# Patient Record
Sex: FEMALE | Race: WHITE | Employment: FULL TIME | URBAN - METROPOLITAN AREA
[De-identification: names, ages, dates, MRNs, and addresses within clinical notes are randomized per-mention and may not be internally consistent; named-entity substitution may affect disease eponyms.]

---

## 2021-04-16 ENCOUNTER — TELEPHONE (OUTPATIENT)
Dept: INTERNAL MEDICINE CLINIC | Age: 61
End: 2021-04-16

## 2021-08-06 ENCOUNTER — OFFICE VISIT (OUTPATIENT)
Dept: INTERNAL MEDICINE CLINIC | Age: 61
End: 2021-08-06
Payer: COMMERCIAL

## 2021-08-06 VITALS
SYSTOLIC BLOOD PRESSURE: 126 MMHG | HEART RATE: 63 BPM | RESPIRATION RATE: 14 BRPM | WEIGHT: 83.4 LBS | HEIGHT: 64 IN | OXYGEN SATURATION: 100 % | TEMPERATURE: 97.6 F | DIASTOLIC BLOOD PRESSURE: 76 MMHG | BODY MASS INDEX: 14.24 KG/M2

## 2021-08-06 DIAGNOSIS — K27.9 PEPTIC ULCER DISEASE: ICD-10-CM

## 2021-08-06 DIAGNOSIS — D22.9 MULTIPLE NEVI: ICD-10-CM

## 2021-08-06 DIAGNOSIS — R63.6 UNDERWEIGHT: ICD-10-CM

## 2021-08-06 DIAGNOSIS — I21.4 NSTEMI (NON-ST ELEVATED MYOCARDIAL INFARCTION) (HCC): ICD-10-CM

## 2021-08-06 DIAGNOSIS — C83.31 DIFFUSE LARGE B-CELL LYMPHOMA OF LYMPH NODES OF NECK (HCC): ICD-10-CM

## 2021-08-06 DIAGNOSIS — Z12.31 ENCOUNTER FOR SCREENING MAMMOGRAM FOR MALIGNANT NEOPLASM OF BREAST: ICD-10-CM

## 2021-08-06 DIAGNOSIS — I25.10 CORONARY ARTERY DISEASE INVOLVING NATIVE CORONARY ARTERY OF NATIVE HEART WITHOUT ANGINA PECTORIS: Primary | ICD-10-CM

## 2021-08-06 DIAGNOSIS — Z00.00 ROUTINE ADULT HEALTH MAINTENANCE: ICD-10-CM

## 2021-08-06 PROBLEM — Z90.5 HISTORY OF NEPHRECTOMY: Status: ACTIVE | Noted: 2021-08-06

## 2021-08-06 PROBLEM — I25.2 HISTORY OF NON-ST ELEVATION MYOCARDIAL INFARCTION (NSTEMI): Status: ACTIVE | Noted: 2021-08-06

## 2021-08-06 PROCEDURE — 99204 OFFICE O/P NEW MOD 45 MIN: CPT | Performed by: INTERNAL MEDICINE

## 2021-08-06 RX ORDER — PANTOPRAZOLE SODIUM 40 MG/1
TABLET, DELAYED RELEASE ORAL
COMMUNITY
Start: 2021-07-21

## 2021-08-06 RX ORDER — RANOLAZINE 1000 MG/1
TABLET, EXTENDED RELEASE ORAL 2 TIMES DAILY
COMMUNITY
Start: 2021-08-02

## 2021-08-06 RX ORDER — LOSARTAN POTASSIUM 50 MG/1
TABLET ORAL DAILY
COMMUNITY

## 2021-08-06 NOTE — ASSESSMENT & PLAN NOTE
Diagnosed with an NSTEMI in 2016. Had a REA to her RCA. Subsequently had an episode of syncope and had an ICD placed. Currently reports noting some shortness of breath with activity over the past 2-3 months. Denies any current chest pain. Advised patient to call her cardiologist at [facility] to move up her appointment from next month given her dyspnea on exertion. Otherwise continue aspirin, Coreg 6.25 twice a day, Plavix 75 daily, losartan 50 daily, ranolazine 1000 twice a day, and atorvastatin 40.   No changes recommended

## 2021-08-06 NOTE — PROGRESS NOTES
Assessment and Plan     1. Coronary artery disease involving native coronary artery of native heart without angina pectoris  Assessment & Plan:  Diagnosed with an NSTEMI in 2016. Had a REA to her RCA. Subsequently had an episode of syncope and had an ICD placed. Currently reports noting some shortness of breath with activity over the past 2-3 months. Denies any current chest pain. Advised patient to call her cardiologist at [facility] to move up her appointment from next month given her dyspnea on exertion. Otherwise continue aspirin, Coreg 6.25 twice a day, Plavix 75 daily, losartan 50 daily, ranolazine 1000 twice a day, and atorvastatin 40. No changes recommended  2. NSTEMI (non-ST elevated myocardial infarction) ([facility] [address]  3. Peptic ulcer disease  Assessment & Plan:  Diagnosed previously. No current issues. Continue pantoprazole 40 mg daily. No changes recommended  4. Underweight  Assessment & Plan:  Lost a significant amount of weight over the past year with her lymphoma diagnosis. She is currently seeing a nutritionist through her oncologist  5. Diffuse large B-cell lymphoma of lymph nodes of neck [facility])  Assessment & Plan:  Diagnosed February 2020 after presenting with neck nodules. Status post chemotherapy. Doing well since then  Continue to follow with Dr. Alek Fishman with oncology  6. Multiple nevi  Assessment & Plan:  Follow-up for skin checks with dermatology  7. Encounter for screening mammogram for malignant neoplasm of breast  -     [facility] 3D KIRAN W MAMMO BI SCREENING INCL CAD; Future  8. Routine adult health maintenance  Assessment & Plan:  Had a colonoscopy done with [facility]. Release form to get those records. Mammogram ordered. Advised to schedule appointment for Pap smear. She is eligible for lung cancer screening but she had a PET scan done last year for her lymphoma diagnosis.   Reevaluate next year       Benefits, risks, possible drug interactions, and side effects of all new medications were reviewed with the patient. Pt verbalized understanding. Return to clinic: 3 months for Pap, weight, shortness of breath, health maintenance    An electronic signature was used to authenticate this note. Robbin Velásquez MD  [facility]  8/6/2021    Future Appointments   Date Time Provider Meghan Garcia   51/2/8557  5:31 PM Ceci Ledesma MD [facility] BS AMB        History of Present Illness   Chief Complaint   Establish care    Arlyn Fischer is a 63 y.o. female         Review of Systems   Constitutional: Negative for chills and fever. HENT: Negative for hearing loss. Eyes: Negative for blurred vision. Respiratory: Positive for shortness of breath. Cardiovascular: Negative for chest pain. Gastrointestinal: Negative for abdominal pain, blood in stool, constipation, diarrhea, melena, nausea and vomiting. Genitourinary: Negative for dysuria and hematuria. Musculoskeletal: Negative for joint pain. Skin: Negative for rash. Neurological: Negative for headaches. Past Medical History   No Known Allergies     Current Outpatient Medications   Medication Sig    losartan (COZAAR) 50 mg tablet daily.  pantoprazole (PROTONIX) 40 mg tablet TAKE 1 TABLET BY MOUTH EVERY MORNING 30 MINUTES BEFORE BREAKFAST    ranolazine ER (RANEXA) 1,000 mg two (2) times a day.  aspirin 81 mg chewable tablet Take 1 Tab by mouth daily.  atorvastatin (LIPITOR) 40 mg tablet Take 1 Tab by mouth nightly.  carvedilol (COREG) 6.25 mg tablet Take 1 Tab by mouth two (2) times daily (with meals).  clopidogrel (PLAVIX) 75 mg tablet Take 1 Tab by mouth daily. No current facility-administered medications for this visit.           Patient Active Problem List   Diagnosis Code    NSTEMI (non-ST elevated myocardial infarction) ([facility] [address] I21.4    CAD (coronary artery disease) I25.10    Peptic ulcer disease K27.9    Underweight R63.6    Diffuse large B-cell lymphoma of lymph nodes of neck (HCC) C83.31    History of nephrectomy Z90.5    Multiple nevi D22.9    Routine adult health maintenance Z00.00     Past Surgical History:   Procedure Laterality Date    HX NEPHRECTOMY      donor to     HX ORTHOPAEDIC      cervical disc surgery    HX OTHER SURGICAL      port placement with subsequent removal after infection      Social History     Tobacco Use    Smoking status: Former Smoker     Packs/day: 0.75     Years: 41.00     Pack years: 30.75     Quit date: 2016     Years since quittin.6    Smokeless tobacco: Never Used   Substance Use Topics    Alcohol use: Yes     Comment: a few times a year      Family History   Problem Relation Age of Onset    Heart Disease Mother         sudden cardiac death    Heart Attack Mother     Other Father         alcoholism    Cancer Brother         throat    Diabetes Neg Hx         Physical Exam   Vitals:       Visit Vitals  /76 (BP 1 Location: Left upper arm, BP Patient Position: Sitting, BP Cuff Size: Adult)   Pulse 63   Temp 97.6 °F (36.4 °C) (Oral)   Resp 14   Ht 5' 4\" (1.626 m)   Wt 83 lb 6.4 oz (37.8 kg)   SpO2 100%   BMI 14.32 kg/m²        Physical Exam  Constitutional:       General: She is not in acute distress. Appearance: She is well-developed. Comments: Thin   HENT:      Right Ear: Tympanic membrane, ear canal and external ear normal.      Left Ear: Tympanic membrane, ear canal and external ear normal.   Eyes:      Extraocular Movements: Extraocular movements intact. Conjunctiva/sclera: Conjunctivae normal.   Cardiovascular:      Rate and Rhythm: Normal rate and regular rhythm. Pulses: Normal pulses. Heart sounds: No murmur heard. No friction rub. No gallop. Pulmonary:      Effort: No respiratory distress. Breath sounds: No wheezing, rhonchi or rales. Abdominal:      General: Bowel sounds are normal. There is no distension. Palpations: Abdomen is soft.  There is no hepatomegaly, splenomegaly or mass. Tenderness: There is no abdominal tenderness. There is no guarding. Musculoskeletal:      Cervical back: Neck supple. Skin:     General: Skin is warm. Findings: No rash. Comments: Tanned skin   Neurological:      Mental Status: She is alert.

## 2021-08-06 NOTE — ASSESSMENT & PLAN NOTE
Diagnosed previously. No current issues. Continue pantoprazole 40 mg daily.   No changes recommended

## 2021-08-06 NOTE — ASSESSMENT & PLAN NOTE
Lost a significant amount of weight over the past year with her lymphoma diagnosis.     She is currently seeing a nutritionist through her oncologist

## 2021-08-06 NOTE — ASSESSMENT & PLAN NOTE
Diagnosed February 2020 after presenting with neck nodules. Status post chemotherapy.   Doing well since then  Continue to follow with Dr. Claudette Fernandez with oncology

## 2021-08-06 NOTE — ASSESSMENT & PLAN NOTE
Had a colonoscopy done with [facility]. Release form to get those records. Mammogram ordered. Advised to schedule appointment for Pap smear. She is eligible for lung cancer screening but she had a PET scan done last year for her lymphoma diagnosis.   Reevaluate next year

## 2021-12-28 ENCOUNTER — DOCUMENTATION ONLY (OUTPATIENT)
Dept: INTERNAL MEDICINE CLINIC | Age: 61
End: 2021-12-28

## 2022-03-18 PROBLEM — D22.9 MULTIPLE NEVI: Status: ACTIVE | Noted: 2021-08-06

## 2022-03-18 PROBLEM — Z90.5 HISTORY OF NEPHRECTOMY: Status: ACTIVE | Noted: 2021-08-06

## 2022-03-18 PROBLEM — R63.6 UNDERWEIGHT: Status: ACTIVE | Noted: 2021-08-06

## 2022-03-19 PROBLEM — C83.31 DIFFUSE LARGE B-CELL LYMPHOMA OF LYMPH NODES OF NECK (HCC): Status: ACTIVE | Noted: 2021-08-06

## 2022-03-19 PROBLEM — K27.9 PEPTIC ULCER DISEASE: Status: ACTIVE | Noted: 2021-08-06

## 2022-03-19 PROBLEM — Z00.00 ROUTINE ADULT HEALTH MAINTENANCE: Status: ACTIVE | Noted: 2021-08-06

## 2022-03-19 PROBLEM — I25.10 CAD (CORONARY ARTERY DISEASE): Status: ACTIVE | Noted: 2021-08-06
